# Patient Record
Sex: FEMALE | Race: AMERICAN INDIAN OR ALASKA NATIVE
[De-identification: names, ages, dates, MRNs, and addresses within clinical notes are randomized per-mention and may not be internally consistent; named-entity substitution may affect disease eponyms.]

---

## 2019-09-01 ENCOUNTER — HOSPITAL ENCOUNTER (EMERGENCY)
Dept: HOSPITAL 5 - ED | Age: 24
Discharge: HOME | End: 2019-09-01
Payer: MEDICAID

## 2019-09-01 VITALS — SYSTOLIC BLOOD PRESSURE: 117 MMHG | DIASTOLIC BLOOD PRESSURE: 73 MMHG

## 2019-09-01 DIAGNOSIS — J45.901: Primary | ICD-10-CM

## 2019-09-01 PROCEDURE — 94640 AIRWAY INHALATION TREATMENT: CPT

## 2019-09-01 PROCEDURE — 99282 EMERGENCY DEPT VISIT SF MDM: CPT

## 2019-09-01 NOTE — EMERGENCY DEPARTMENT REPORT
ED Asthma HPI





- General


Chief Complaint: Adult Asthma


Stated Complaint: ASTHMA


Time Seen by Provider: 09/01/19 07:07


Source: patient


Mode of arrival: Ambulatory


Limitations: No Limitations





- History of Present Illness


Initial Comments: 





This is a 24-year-old female nontoxic, well nourished in appearance, no acute 

signs of distress presents to the ED with c/o of acute on chronic asthma 

exacerbation.  Patient stated she is out of her albuterol inhaler 1 month.  

Patient denies any cough.  Patient denies any sick contact.  Patient denies any 

recent travels, long car, recent hospital stays.  Patient denies any calf pain 

or calf tenderness.  Patient denies any chest pain, short of breath, fever, 

chills, nausea, vomiting, hemoptysis, numbness, tingling, headache or stiff 

neck.  Past medical history includes asthma.


MD Complaint: "asthma attack", wheezing


-: This morning


Asthma History: childhood onset


Severity: mild


Context: none known


Associated Symptoms: none.  denies: productive cough, dry cough, fever, chest 

pain, hemoptysis, leg edema, syncope





- Related Data


Current Asthma Therapy: none


                                Home Medications











 Medication  Instructions  Recorded  Confirmed  Last Taken


 


ALBUTEROL Inhaler (OR & NICU) 2 puff IH QID PRN 08/23/15 08/23/15 Unknown





[ProAir HFA Inhaler]    








                                  Previous Rx's











 Medication  Instructions  Recorded  Last Taken  Type


 


Albuterol Sulfate [Ventolin HFA] 2 puff IH Q4H PRN #1 hfa.aer.ad 08/23/15 

Unknown Rx


 


predniSONE [Deltasone] 40 mg PO QDAY #10 tab 08/23/15 Unknown Rx


 


ALBUTEROL Inhaler (OR & NICU) 2 puff IH QID PRN #1 inhalation 09/01/19 Unknown 

Rx





[ProAir HFA Inhaler]    


 


Prednisone [predniSONE 10 mg 10 mg PO .TAPER #1 tab.ds.pk 09/01/19 Unknown Rx





(6-Day Pack, 21 Tabs)]    











                                    Allergies











Allergy/AdvReac Type Severity Reaction Status Date / Time


 


No Known Allergies Allergy   Verified 08/23/15 06:05














ED Review of Systems


ROS: 


Stated complaint: ASTHMA


Other details as noted in HPI





Constitutional: denies: chills, fever


Eyes: denies: eye pain, eye discharge, vision change


ENT: denies: ear pain, throat pain


Respiratory: wheezing.  denies: cough, shortness of breath


Cardiovascular: denies: chest pain, palpitations


Endocrine: no symptoms reported


Gastrointestinal: denies: abdominal pain, nausea, diarrhea


Genitourinary: denies: urgency, dysuria, discharge


Musculoskeletal: denies: back pain, joint swelling, arthralgia


Skin: denies: rash, lesions


Neurological: denies: headache, weakness, paresthesias


Psychiatric: denies: anxiety, depression


Hematological/Lymphatic: denies: easy bleeding, easy bruising





ED Past Medical Hx





- Past Medical History


Previous Medical History?: Yes


Hx Asthma: Yes





- Surgical History


Past Surgical History?: No





- Social History


Smoking Status: Never Smoker





- Medications


Home Medications: 


                                Home Medications











 Medication  Instructions  Recorded  Confirmed  Last Taken  Type


 


ALBUTEROL Inhaler (OR & NICU) 2 puff IH QID PRN 08/23/15 08/23/15 Unknown 

History





[ProAir HFA Inhaler]     


 


Albuterol Sulfate [Ventolin HFA] 2 puff IH Q4H PRN #1 hfa.aer.ad 08/23/15  

Unknown Rx


 


predniSONE [Deltasone] 40 mg PO QDAY #10 tab 08/23/15  Unknown Rx


 


ALBUTEROL Inhaler (OR & NICU) 2 puff IH QID PRN #1 inhalation 09/01/19  Unknown 

Rx





[ProAir HFA Inhaler]     


 


Prednisone [predniSONE 10 mg 10 mg PO .TAPER #1 tab.ds.pk 09/01/19  Unknown Rx





(6-Day Pack, 21 Tabs)]     














ED Physical Exam





- General


Limitations: No Limitations


General appearance: alert, in no apparent distress





- Head


Head exam: Present: atraumatic, normocephalic





- Neck


Neck exam: Present: normal inspection, full ROM.  Absent: tenderness, 

meningismus, lymphadenopathy





- Respiratory


Respiratory exam: Present: normal lung sounds bilaterally.  Absent: respiratory 

distress, wheezes, rales, rhonchi, stridor, chest wall tenderness, accessory 

muscle use, decreased breath sounds, prolonged expiratory





- Cardiovascular


Cardiovascular Exam: Present: regular rate, normal rhythm, normal heart sounds. 

Absent: irregular rhythm, systolic murmur, diastolic murmur, rubs, gallop





- Extremities Exam


Extremities exam: Present: normal inspection, full ROM, normal capillary refill.

 Absent: tenderness





- Back Exam


Back exam: Present: normal inspection, full ROM





- Neurological Exam


Neurological exam: Present: alert, oriented X3, normal gait





- Psychiatric


Psychiatric exam: Present: normal affect, normal mood





- Skin


Skin exam: Present: warm, dry, intact, normal color.  Absent: rash





ED Course





                                   Vital Signs











  09/01/19





  05:26


 


Temperature 98.4 F


 


Pulse Rate 91 H


 


Respiratory 18





Rate 


 


Blood Pressure 117/73


 


O2 Sat by Pulse 95





Oximetry 














- Reevaluation(s)


Reevaluation #1: 





09/01/19 07:20


Patient is speaking in full sentences with no signs of distress noted.





ED Medical Decision Making





- Medical Decision Making





This is a 24-year-old female that presents with asthma exacerbation.  Patient is

stable and was examined by me. Aleah WOODARD stated that patient had bilateral 

wheezing and a breathing treatment has been ordered.  Upon my examination and 

interview after breathing treatment in the symptoms of wheezing has subsided.  

Patient did receive steroids in the ED which patient the symptoms has resolved 

and subsided.  Posttreatment and there is no wheezing upon auscultation.  

Patient is discharged with albuterol and prednisone.  Patient was referred to 

Follow-up with a primary care doctor in 3-5 days or if symptoms worsen and 

continue return to emergency room as soon as possible.  At time of discharge, 

the patient does not seem toxic or ill in appearance.  No acute signs of 

distress noted.  Patient agrees to discharge treatment plan of care.  No further

questions noted by the patient.





This chart is dictated with using Dragon Dictation Program


Critical care attestation.: 


If time is entered above; I have spent that time in minutes in the direct care 

of this critically ill patient, excluding procedure time.








ED Disposition


Clinical Impression: 


 Acute asthma exacerbation





Disposition: DC-01 TO HOME OR SELFCARE


Is pt being admited?: No


Does the pt Need Aspirin: No


Condition: Stable


Instructions:  Asthma (ED)


Additional Instructions: 


Follow-up with a primary care doctor in 3-5 days or if symptoms worsen and 

continue return to emergency room as soon as possible. 


Prescriptions: 


Prednisone [predniSONE 10 mg (6-Day Pack, 21 Tabs)] 10 mg PO .TAPER #1 tab.ds.pk


ALBUTEROL Inhaler (OR & NICU) [ProAir HFA Inhaler] 2 puff IH QID PRN #1 

inhalation


 PRN Reason: Shortness Of Breath


Referrals: 


PRIMARY CARE,MD [Referring] - 3-5 Days


GEE COHEN MD [Staff Physician] - 3-5 Days


[facility] [place] [Outside] - 3-5 Days


[facility] [Outside] - 3-5 Days


Forms:  Work/School Release Form(ED)

## 2019-10-29 ENCOUNTER — HOSPITAL ENCOUNTER (EMERGENCY)
Dept: HOSPITAL 5 - ED | Age: 24
Discharge: HOME | End: 2019-10-29
Payer: SELF-PAY

## 2019-10-29 VITALS — DIASTOLIC BLOOD PRESSURE: 66 MMHG | SYSTOLIC BLOOD PRESSURE: 110 MMHG

## 2019-10-29 DIAGNOSIS — J15.7: Primary | ICD-10-CM

## 2019-10-29 DIAGNOSIS — J45.909: ICD-10-CM

## 2019-10-29 DIAGNOSIS — F17.200: ICD-10-CM

## 2019-10-29 PROCEDURE — 71046 X-RAY EXAM CHEST 2 VIEWS: CPT

## 2019-10-29 PROCEDURE — 99283 EMERGENCY DEPT VISIT LOW MDM: CPT

## 2019-10-29 NOTE — EMERGENCY DEPARTMENT REPORT
Blank Doc





- Documentation


Documentation: 





24-year-old female that presents with URI symptoms.





This initial assessment/diagnostic orders/clinical plan/treatment(s) is/are 

subject to change based on patient's health status, clinical progression and re-

assessment by fellow clinical providers in the ED.  Further treatment and workup

at subsequent clinical providers discretion.  Patient/guardians urged not to 

elope from the ED as their condition may be serious if not clinically assessed 

and managed.  Initial orders include:


1- Patient sent to [facility] for further evaluation and treatment


2- cXR

## 2019-10-29 NOTE — EMERGENCY DEPARTMENT REPORT
Minor Respiratory





- HPI


Chief Complaint: Upper Respiratory Infection


Stated Complaint: FLU LIKE SYMPTOMS


Time Seen by Provider: 10/29/19 14:06


Duration: 2 Days


Pain Location: Nose


Severity: moderate


Minor Respiratory: Yes Rhinorrhea, Yes Able to Tolerate Fluids, Yes Cough, Yes 

Sick Contacts, No Sore Throat, No Ear Pain, No Hemoptysis, No Chest Pain, No 

Shortness of Breath, No Fever


Other History: This is a 24-year-old -American female who presents to the

emergency room with body aches, congestion, cough, chills for 2 days.  Patient s

tates she has taken cold and flu medication with minimal improvement of 

symptoms.  She denies sore throat, chest pain, nausea, vomiting, diarrhea, or 

coryza.





ED Review of Systems


ROS: 


Stated complaint: FLU LIKE SYMPTOMS


Other details as noted in HPI





Constitutional: chills.  denies: fever


ENT: congestion.  denies: ear pain, throat pain


Respiratory: cough.  denies: shortness of breath, wheezing


Cardiovascular: denies: chest pain, palpitations


Gastrointestinal: denies: abdominal pain, nausea, diarrhea


Musculoskeletal: myalgia.  denies: back pain, joint swelling, arthralgia


Skin: denies: rash, lesions


Neurological: denies: headache, weakness, paresthesias


Psychiatric: denies: anxiety, depression





ED Past Medical Hx





- Past Medical History


Previous Medical History?: Yes


Hx Asthma: Yes





- Surgical History


Past Surgical History?: No





- Social History


Smoking Status: Current Every Day Smoker


Substance Use Type: None





- Medications


Home Medications: 


                                Home Medications











 Medication  Instructions  Recorded  Confirmed  Last Taken  Type


 


ALBUTEROL Inhaler (OR & NICU) 2 puff IH QID PRN 08/23/15 08/23/15 Unknown 

History





[ProAir HFA Inhaler]     


 


Albuterol Sulfate [Ventolin HFA] 2 puff IH Q4H PRN #1 hfa.aer.ad 08/23/15  

Unknown Rx


 


predniSONE [Deltasone] 40 mg PO QDAY #10 tab 08/23/15  Unknown Rx


 


ALBUTEROL Inhaler (OR & NICU) 2 puff IH QID PRN #1 inhalation 09/01/19  Unknown 

Rx





[ProAir HFA Inhaler]     


 


Prednisone [predniSONE 10 mg 10 mg PO .TAPER #1 tab.ds.pk 09/01/19  Unknown Rx





(6-Day Pack, 21 Tabs)]     


 


Azithromycin [Zithromax Z-ALEX] 250 mg PO DAILY #6 tablet 10/29/19  Unknown Rx














Minor Respiratory Exam





- Exam


General: 


Vital signs noted. No distress. Alert and acting appropriately.





HEENT: Yes Moist Mucous Membranes, Yes Rhinorrhea (turbinates congested with 

clear discharge), No Pharyngeal Erythema, No Pharyngeal Exudates, No Conjuctival

Injection, No Frontal Tenderness, No Maxillary Tenderness


Ear: Neither TM Bulge, Neither TM Erythema, Neither EAC Pain, Neither EAC 

Discharge


Neck: Yes Supple, No Adenopathy


Lungs: Yes Good Air Exchange, No Wheezes, No Ronchi, No Stridor, No Cough, No 

Labored Respirations, No Retractions, No Use of Accessory Muscles, No Other 

Abnormal Lung Sounds


Heart: Yes Regular, No Murmur


Abdomen: Yes Normal Bowel Sounds, No Tenderness, No Peritoneal Signs


Skin: No Rash, No Edema


Neurologic: 


Alert and oriented, no deficits.








Musculoskeletal: 


Unremarkable.











ED Course


                                   Vital Signs











  10/29/19





  14:07


 


Temperature 97.9 F


 


Pulse Rate 100 H


 


Respiratory 18





Rate 


 


Blood Pressure 110/66





[Right] 


 


O2 Sat by Pulse 97





Oximetry 














ED Medical Decision Making





- Radiology Data


Radiology results: report reviewed





Right middle lobe infiltrate





- Medical Decision Making





Patient is stable and was examined by me.  Chest xray has been obtained and 

dictated by radiologist with right middle lobe infiltrate.  Patient notified of 

x-ray results of pneumonia.  Patient does not seem toxic or ill in appearance.  

No acute signs of distress noted.  Start azithromycin.  Patient agrees to the ED

 plan of care to treat outpatient.  No further questions noted. Discharged home 

stable. Follow up with PCP in 24-48 hours.


Critical care attestation.: 


If time is entered above; I have spent that time in minutes in the direct care 

of this critically ill patient, excluding procedure time.








ED Disposition


Clinical Impression: 


 Cough in adult





Pneumonia


Qualifiers:


 Pneumonia type: due to Mycoplasma pneumoniae Laterality: right Lung location: 

middle lobe of lung Qualified Code(s): J15.7 - Pneumonia due to Mycoplasma 

pneumoniae





Disposition: DC-01 TO HOME OR SELFCARE


Is pt being admited?: No


Condition: Stable


Instructions:  Bacterial Pneumonia (ED)


Additional Instructions: 


Complete full course of medication as prescribed.


Follow up with [facility] in 48-72 hours.


Increase fluids to prevent dehydration.


Avoid smoking and rest.


Prescriptions: 


Azithromycin [Zithromax Z-ALEX] 250 mg PO DAILY #6 tablet


Referrals: 


[facility] [Outside] - 3-5 Days


[facility] [Outside] - 3-5 Days


[facility] [Outside] - 3-5 Days


Forms:  Work/School Release Form(ED)


Time of Disposition: 16:49

## 2019-10-29 NOTE — XRAY REPORT
CHEST 2 VIEWS 



INDICATION: 

cough.



COMPARISON: 

None.



FINDINGS:

Support devices: None.



Heart: Within normal limits. 

Lungs/Pleura: Right middle lobe infiltrate. No significant pleural effusion. 



IMPRESSION:  No acute findings.



Signer Name: Mukund Rivera MD 

Signed: 10/29/2019 4:16 PM

 Workstation Name: [id number]

## 2021-02-18 ENCOUNTER — DASHBOARD ENCOUNTERS (OUTPATIENT)
Age: 26
End: 2021-02-18

## 2021-02-19 ENCOUNTER — OFFICE VISIT (OUTPATIENT)
Dept: URBAN - METROPOLITAN AREA CLINIC 92 | Facility: CLINIC | Age: 26
End: 2021-02-19

## 2021-02-19 PROBLEM — 14760008 CONSTIPATION: Status: ACTIVE | Noted: 2021-02-19

## 2021-02-19 RX ORDER — DOXYCYCLINE HYCLATE 100 MG/1
1 TABLET CAPSULE, GELATIN COATED ORAL
Status: ACTIVE | COMMUNITY

## 2021-02-19 RX ORDER — FLUTICASONE PROPIONATE AND SALMETEROL 50; 250 UG/1; UG/1
1 PUFF POWDER RESPIRATORY (INHALATION) TWICE A DAY
Status: ACTIVE | COMMUNITY

## 2021-02-19 RX ORDER — MONTELUKAST SODIUM 10 MG/1
1 TABLET TABLET ORAL ONCE A DAY
Status: ACTIVE | COMMUNITY

## 2021-02-19 RX ORDER — METRONIDAZOLE 500 MG/1
1 TABLET TABLET ORAL THREE TIMES A DAY
Status: ACTIVE | COMMUNITY